# Patient Record
Sex: FEMALE | Race: AMERICAN INDIAN OR ALASKA NATIVE
[De-identification: names, ages, dates, MRNs, and addresses within clinical notes are randomized per-mention and may not be internally consistent; named-entity substitution may affect disease eponyms.]

---

## 2017-12-04 ENCOUNTER — HOSPITAL ENCOUNTER (EMERGENCY)
Dept: HOSPITAL 31 - C.ER | Age: 22
Discharge: HOME | End: 2017-12-04
Payer: COMMERCIAL

## 2017-12-04 VITALS
RESPIRATION RATE: 18 BRPM | HEART RATE: 96 BPM | SYSTOLIC BLOOD PRESSURE: 133 MMHG | DIASTOLIC BLOOD PRESSURE: 84 MMHG | TEMPERATURE: 98 F | OXYGEN SATURATION: 100 %

## 2017-12-04 DIAGNOSIS — Z04.1: Primary | ICD-10-CM
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
## 2019-02-16 ENCOUNTER — HOSPITAL ENCOUNTER (EMERGENCY)
Dept: HOSPITAL 31 - C.ER | Age: 24
Discharge: HOME | End: 2019-02-16
Payer: MEDICAID

## 2019-02-16 VITALS
DIASTOLIC BLOOD PRESSURE: 89 MMHG | TEMPERATURE: 98.8 F | SYSTOLIC BLOOD PRESSURE: 120 MMHG | RESPIRATION RATE: 18 BRPM | OXYGEN SATURATION: 99 % | HEART RATE: 101 BPM

## 2019-02-16 DIAGNOSIS — J06.9: Primary | ICD-10-CM

## 2019-02-16 NOTE — C.PDOC
History Of Present Illness





24 y/o female comes in to ED complaining of a nonproductive cough, sore throat, 

and greenish mucus in her throat since yesterday. Patient denies fever, 

headache, dizziness, or neck pain. Patient had no direct contact with the child 

who  from meningitis or the chung mother at the building they live in. 





Time Seen by Provider: 19 11:37


Chief Complaint (Nursing): ENT Problem


History Per: Patient


History/Exam Limitations: None


Onset/Duration Of Symptoms: Days


Current Symptoms Are (Timing): Still Present





Past Medical History


Reviewed: Historical Data, Nursing Documentation, Vital Signs


Vital Signs: 





                                Last Vital Signs











Temp  98.8 F   19 11:36


 


Pulse  101 H  19 11:36


 


Resp  18   19 11:36


 


BP  120/89   19 11:36


 


Pulse Ox  99   19 11:36











Family History: States: No Known Family Hx





- Social History


Hx Tobacco Use: No


Hx Alcohol Use: No


Hx Substance Use: No





- Immunization History


Hx Tetanus Toxoid Vaccination: No


Hx Influenza Vaccination: No


Hx Pneumococcal Vaccination: No





Review Of Systems


Constitutional: Negative for: Fever, Chills


ENT: Positive for: Throat Pain (sore throat)


Respiratory: Positive for: Cough (nonproductive), Sputum (greenish)


Musculoskeletal: Negative for: Neck Pain


Neurological: Negative for: Headache, Dizziness





Physical Exam





- Physical Exam


Appears: Non-toxic, No Acute Distress


Skin: Warm, Dry


Head: Atraumatic, Normacephalic


Eye(s): bilateral: Normal Inspection


Oral Mucosa: Moist


Neck: Supple


Cardiovascular: Rhythm Regular, No Murmur


Respiratory: Normal Breath Sounds, No Rales, No Rhonchi, No Wheezing


Extremity: Bilateral: Atraumatic, Normal Color And Temperature, Normal ROM


Neurological/Psych: Oriented x3, Normal Speech





ED Course And Treatment


O2 Sat by Pulse Oximetry: 99 (RA)


Pulse Ox Interpretation: Normal


Progress Note: Patient was given tessalon and mucinex PO. Will be discharged 

home with mucinex and tessalon.





Disposition


Counseled Patient/Family Regarding: Diagnosis, Need For Followup, Rx Given





- Disposition


Referrals: 


[facility] [Outside]


Disposition: HOME/ ROUTINE


Disposition Time: 12:10


Condition: STABLE


Additional Instructions: 


FOLLOW UP WITH YOUR DOCTOR IN 1-2 DAYS





USE MEDICATIONS AS DIRECTED





RETURN TO ER IF SYMPTOMS WORSEN 


Prescriptions: 


Benzonatate [Tessalon Perles] 100 mg PO BID PRN #15 sgl


 PRN Reason: Cough


Guaifenesin [Mucinex] 600 mg PO BID PRN #15 [id number]


 PRN Reason: MUCUS


Instructions:  Viral Upper Respiratory Infection, Adult (DC)


Forms:  [facility] (English)


Print Language: ENGLISH





- Clinical Impression


Clinical Impression: 


 Viral upper respiratory infection








- Scribe Statement


The provider has reviewed the documentation as recorded by the Chase Crouch


Provider Attestation: 





All medical record entries made by the Chase were at my direction and 

personally dictated by me. I have reviewed the chart and agree that the record 

accurately reflects my personal performance of the history, physical exam, 

medical decision making, and the department course for this patient. I have also

personally directed, reviewed, and agree with the discharge instructions and 

disposition.